# Patient Record
Sex: MALE | Race: WHITE | NOT HISPANIC OR LATINO | URBAN - METROPOLITAN AREA
[De-identification: names, ages, dates, MRNs, and addresses within clinical notes are randomized per-mention and may not be internally consistent; named-entity substitution may affect disease eponyms.]

---

## 2018-05-29 NOTE — PATIENT DISCUSSION
New Prescription: Maxitrol (neomycin-polymyxin-dexameth): ointment: 3.5-10,000-0.1 mg-unit/g-% a small amount at bedtime into both eyes 05-

## 2019-06-11 NOTE — PATIENT DISCUSSION
New Prescription: fluorometholone (fluorometholone): drops,suspension: 0.1% 1 drop twice a day into affected eye 06-

## 2019-06-11 NOTE — PATIENT DISCUSSION
BLEPHARITIS, OU: PRESCRIBE WARM COMPRESSES AND EYELID SCRUBS QD-BID, ARTIFICIAL TEARS BID-QID AND ANTIBIOTIC OINTMENT CAN TRY FML TID OU AS NEEDED. RETURN FOR FOLLOW-UP AS SCHEDULED.

## 2020-12-01 NOTE — PATIENT DISCUSSION
Continue: fluorometholone (fluorometholone): drops,suspension: 0.1% 1 drop twice a day into affected eye 06-

## 2022-09-19 ENCOUNTER — PREPPED CHART (OUTPATIENT)
Dept: URBAN - METROPOLITAN AREA CLINIC 49 | Facility: CLINIC | Age: 82
End: 2022-09-19

## 2022-09-26 ENCOUNTER — NEW PATIENT (OUTPATIENT)
Dept: URBAN - METROPOLITAN AREA CLINIC 49 | Facility: CLINIC | Age: 82
End: 2022-09-26

## 2022-09-26 DIAGNOSIS — H16.223: ICD-10-CM

## 2022-09-26 DIAGNOSIS — H25.13: ICD-10-CM

## 2022-09-26 DIAGNOSIS — H43.811: ICD-10-CM

## 2022-09-26 PROCEDURE — 92134 CPTRZ OPH DX IMG PST SGM RTA: CPT

## 2022-09-26 PROCEDURE — 92004 COMPRE OPH EXAM NEW PT 1/>: CPT

## 2022-09-26 ASSESSMENT — VISUAL ACUITY
OS_GLARE: 20/200
OD_PH: 20/40
OS_SC: 20/60-1
OS_GLARE: >20/400
OD_SC: J7@16"
OD_SC: 20/60+2
OS_PH: 20/40
OD_GLARE: >20/400
OS_SC: J10@16"
OD_GLARE: 20/200

## 2022-09-26 ASSESSMENT — TONOMETRY
OD_IOP_MMHG: 11
OS_IOP_MMHG: 11

## 2022-10-24 ENCOUNTER — PRE-OP/H&P (OUTPATIENT)
Dept: URBAN - METROPOLITAN AREA CLINIC 49 | Facility: CLINIC | Age: 82
End: 2022-10-24

## 2022-10-24 DIAGNOSIS — H25.12: ICD-10-CM

## 2022-10-24 PROCEDURE — PREOP PRE OP VISIT

## 2022-10-24 PROCEDURE — 92136 OPHTHALMIC BIOMETRY: CPT

## 2022-10-24 ASSESSMENT — KERATOMETRY
OD_K1POWER_DIOPTERS: 41.12
OD_K2POWER_DIOPTERS: 40.67
OS_AXISANGLE_DEGREES: 158
OS_K2POWER_DIOPTERS: 40.12
OS_AXISANGLE2_DEGREES: 68
OD_AXISANGLE2_DEGREES: 100
OS_K1POWER_DIOPTERS: 40.87
OD_AXISANGLE_DEGREES: 010

## 2022-10-24 ASSESSMENT — TONOMETRY
OS_IOP_MMHG: 12
OD_IOP_MMHG: 12

## 2022-10-24 ASSESSMENT — VISUAL ACUITY
OD_SC: 20/50
OS_SC: 20/50

## 2022-11-01 ENCOUNTER — POST-OP (OUTPATIENT)
Dept: URBAN - METROPOLITAN AREA CLINIC 48 | Facility: LOCATION | Age: 82
End: 2022-11-01

## 2022-11-01 ENCOUNTER — SURGERY/PROCEDURE (OUTPATIENT)
Dept: URBAN - METROPOLITAN AREA SURGERY 16 | Facility: SURGERY | Age: 82
End: 2022-11-01

## 2022-11-01 DIAGNOSIS — Z96.1: ICD-10-CM

## 2022-11-01 DIAGNOSIS — H25.12: ICD-10-CM

## 2022-11-01 DIAGNOSIS — Z98.42: ICD-10-CM

## 2022-11-01 PROCEDURE — 66984 XCAPSL CTRC RMVL W/O ECP: CPT

## 2022-11-01 ASSESSMENT — KERATOMETRY
OD_AXISANGLE2_DEGREES: 100
OS_K1POWER_DIOPTERS: 40.87
OD_K2POWER_DIOPTERS: 40.67
OD_K2POWER_DIOPTERS: 40.67
OD_K1POWER_DIOPTERS: 41.12
OD_AXISANGLE_DEGREES: 010
OD_K1POWER_DIOPTERS: 41.12
OS_AXISANGLE_DEGREES: 158
OS_K2POWER_DIOPTERS: 40.12
OD_AXISANGLE_DEGREES: 010
OS_AXISANGLE2_DEGREES: 68
OD_AXISANGLE2_DEGREES: 100
OS_K1POWER_DIOPTERS: 40.87
OS_K2POWER_DIOPTERS: 40.12
OS_AXISANGLE_DEGREES: 158
OS_AXISANGLE2_DEGREES: 68

## 2022-11-01 ASSESSMENT — TONOMETRY: OS_IOP_MMHG: 18

## 2022-11-01 ASSESSMENT — VISUAL ACUITY: OS_SC: 20/70

## 2022-11-07 ENCOUNTER — POST OP/EVAL OF SECOND EYE (OUTPATIENT)
Dept: URBAN - METROPOLITAN AREA CLINIC 49 | Facility: CLINIC | Age: 82
End: 2022-11-07

## 2022-11-07 DIAGNOSIS — Z96.1: ICD-10-CM

## 2022-11-07 DIAGNOSIS — H25.11: ICD-10-CM

## 2022-11-07 DIAGNOSIS — Z98.42: ICD-10-CM

## 2022-11-07 PROCEDURE — 92136 - 2N OPHTHALMIC BIOMETRY BY PARTIAL COHERENCE INTERFEROMETRY WITH INTRAOCULAR LENS POWER CALCULATION

## 2022-11-07 PROCEDURE — 99213 OFFICE O/P EST LOW 20 MIN: CPT

## 2022-11-07 ASSESSMENT — KERATOMETRY
OS_K1POWER_DIOPTERS: 40.75
OD_AXISANGLE2_DEGREES: 104
OS_AXISANGLE_DEGREES: 0
OS_K2POWER_DIOPTERS: 40.75
OD_K2POWER_DIOPTERS: 42.25
OS_AXISANGLE2_DEGREES: 90
OD_K1POWER_DIOPTERS: 41.25
OD_AXISANGLE_DEGREES: 014

## 2022-11-07 ASSESSMENT — TONOMETRY
OD_IOP_MMHG: 14
OS_IOP_MMHG: 13

## 2022-11-07 ASSESSMENT — VISUAL ACUITY
OD_SC: 20/50
OD_GLARE: 20/400
OD_PH: 20/25
OS_SC: 20/25
OD_GLARE: 20/150

## 2022-11-15 ENCOUNTER — SURGERY/PROCEDURE (OUTPATIENT)
Dept: URBAN - METROPOLITAN AREA SURGERY 16 | Facility: SURGERY | Age: 82
End: 2022-11-15

## 2022-11-15 ENCOUNTER — POST-OP (OUTPATIENT)
Dept: URBAN - METROPOLITAN AREA CLINIC 48 | Facility: LOCATION | Age: 82
End: 2022-11-15

## 2022-11-15 DIAGNOSIS — Z98.41: ICD-10-CM

## 2022-11-15 DIAGNOSIS — H25.11: ICD-10-CM

## 2022-11-15 DIAGNOSIS — Z96.1: ICD-10-CM

## 2022-11-15 PROCEDURE — 66984 XCAPSL CTRC RMVL W/O ECP: CPT

## 2022-11-15 ASSESSMENT — KERATOMETRY
OS_K2POWER_DIOPTERS: 40.75
OS_AXISANGLE2_DEGREES: 90
OD_K2POWER_DIOPTERS: 42.25
OS_K1POWER_DIOPTERS: 40.75
OD_AXISANGLE_DEGREES: 014
OS_AXISANGLE2_DEGREES: 90
OS_K1POWER_DIOPTERS: 40.75
OS_AXISANGLE_DEGREES: 0
OD_K1POWER_DIOPTERS: 41.25
OD_K2POWER_DIOPTERS: 42.25
OD_K1POWER_DIOPTERS: 41.25
OS_K2POWER_DIOPTERS: 40.75
OD_AXISANGLE_DEGREES: 014
OD_AXISANGLE2_DEGREES: 104
OS_AXISANGLE_DEGREES: 0
OD_AXISANGLE2_DEGREES: 104

## 2022-11-15 ASSESSMENT — TONOMETRY: OD_IOP_MMHG: 20

## 2022-11-15 ASSESSMENT — VISUAL ACUITY: OD_SC: 20/100

## 2022-11-28 ENCOUNTER — POST-OP (OUTPATIENT)
Dept: URBAN - METROPOLITAN AREA CLINIC 49 | Facility: CLINIC | Age: 82
End: 2022-11-28

## 2022-11-28 DIAGNOSIS — Z96.1: ICD-10-CM

## 2022-11-28 DIAGNOSIS — Z98.41: ICD-10-CM

## 2022-11-28 PROCEDURE — 99024 POSTOP FOLLOW-UP VISIT: CPT

## 2022-11-28 ASSESSMENT — KERATOMETRY
OS_K1POWER_DIOPTERS: 40.25
OS_AXISANGLE_DEGREES: 089
OD_AXISANGLE_DEGREES: 017
OD_AXISANGLE2_DEGREES: 107
OD_K2POWER_DIOPTERS: 41.75
OS_K2POWER_DIOPTERS: 41.25
OD_K1POWER_DIOPTERS: 41.00
OS_AXISANGLE2_DEGREES: 179

## 2022-11-28 ASSESSMENT — VISUAL ACUITY
OU_SC: 20/20
OD_SC: 20/25-2
OS_SC: 20/20

## 2022-11-28 ASSESSMENT — TONOMETRY
OS_IOP_MMHG: 14
OD_IOP_MMHG: 16

## 2022-12-12 ENCOUNTER — POST-OP (OUTPATIENT)
Dept: URBAN - METROPOLITAN AREA CLINIC 49 | Facility: CLINIC | Age: 82
End: 2022-12-12

## 2022-12-12 PROCEDURE — 99024 POSTOP FOLLOW-UP VISIT: CPT

## 2022-12-12 PROCEDURE — 92015 DETERMINE REFRACTIVE STATE: CPT

## 2022-12-12 ASSESSMENT — KERATOMETRY
OS_AXISANGLE2_DEGREES: 90
OS_K1POWER_DIOPTERS: 41.50
OS_K2POWER_DIOPTERS: 41.50
OD_K1POWER_DIOPTERS: 41.25
OD_AXISANGLE2_DEGREES: 90
OD_AXISANGLE_DEGREES: 0
OD_K2POWER_DIOPTERS: 41.25
OS_AXISANGLE_DEGREES: 0

## 2022-12-12 ASSESSMENT — VISUAL ACUITY
OS_SC: J5 @ 14IN
OU_SC: 20/25-2
OD_SC: 20/30-2
OS_SC: 20/50+1
OU_SC: J3 @ 14IN
OS_PH: 20/25
OD_SC: J7 @ 14IN

## 2022-12-12 ASSESSMENT — TONOMETRY
OD_IOP_MMHG: 08
OS_IOP_MMHG: 08